# Patient Record
Sex: MALE | Race: WHITE | NOT HISPANIC OR LATINO | Employment: OTHER | URBAN - METROPOLITAN AREA
[De-identification: names, ages, dates, MRNs, and addresses within clinical notes are randomized per-mention and may not be internally consistent; named-entity substitution may affect disease eponyms.]

---

## 2017-10-11 ENCOUNTER — CLINICAL SUPPORT (OUTPATIENT)
Dept: AUDIOLOGY | Facility: CLINIC | Age: 72
End: 2017-10-11
Payer: COMMERCIAL

## 2017-10-11 ENCOUNTER — OFFICE VISIT (OUTPATIENT)
Dept: OTOLARYNGOLOGY | Facility: CLINIC | Age: 72
End: 2017-10-11
Payer: COMMERCIAL

## 2017-10-11 VITALS — HEIGHT: 68 IN | BODY MASS INDEX: 27.63 KG/M2 | WEIGHT: 182.31 LBS

## 2017-10-11 DIAGNOSIS — H90.3 SNHL (SENSORY-NEURAL HEARING LOSS), ASYMMETRICAL: ICD-10-CM

## 2017-10-11 DIAGNOSIS — H90.3 SENSORINEURAL HEARING LOSS (SNHL) OF BOTH EARS: Primary | ICD-10-CM

## 2017-10-11 DIAGNOSIS — D33.3 CPA (CEREBELLOPONTINE ANGLE) TUMOR: Primary | ICD-10-CM

## 2017-10-11 PROCEDURE — 99212 OFFICE O/P EST SF 10 MIN: CPT | Mod: PBBFAC | Performed by: OTOLARYNGOLOGY

## 2017-10-11 PROCEDURE — 99999 PR PBB SHADOW E&M-EST. PATIENT-LVL II: CPT | Mod: PBBFAC,,, | Performed by: OTOLARYNGOLOGY

## 2017-10-11 PROCEDURE — 92567 TYMPANOMETRY: CPT | Mod: PBBFAC | Performed by: AUDIOLOGIST

## 2017-10-11 PROCEDURE — 99205 OFFICE O/P NEW HI 60 MIN: CPT | Mod: S$PBB,,, | Performed by: OTOLARYNGOLOGY

## 2017-10-11 PROCEDURE — 92557 COMPREHENSIVE HEARING TEST: CPT | Mod: PBBFAC | Performed by: AUDIOLOGIST

## 2017-10-11 NOTE — PROGRESS NOTES
Subjective:       Patient ID: Dao Castro is a 72 y.o. male.    Chief Complaint: Hearing Loss    HPI   72 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  He initially presented c persistent asymmetric SNHL on left, assoc with progressive, gradual decr in hearing. 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    He currently denies any otologic stx including hearing loss, otalgia, otorrhea, tinnitus, vertigo, facial nn weakness. Denies intracranial symptoms.    He denies h/o trauma or ear surgery or ear pathology. Wears hearing aid on right.    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        Review of Systems    Review of Systems reviewed including    CONSTITUTIONAL: no fevers, chills, weight loss, night sweats  EYES: no diplopia, no blurry vision  ENT: as above   NEURO: no motor or sensory deficits  CV: no CP, no palpitations  PULM: no cough, no SOB, no wheezing   GI: no abd pain, no constipation/diarrhea  : no dysuria, no hematuria  MSK: no bone/joint pain  HEM: no bruising or bleeding   PSYCH - no depression, no anxiety    Past Medical History:   Diagnosis Date    Dizziness     Hearing loss     Hyperlipidemia          Current Outpatient Prescriptions:     ATORVASTATIN CALCIUM (ATORVASTATIN ORAL), Take by mouth., Disp: , Rfl:     Past Surgical History:   Procedure Laterality Date    APPENDECTOMY      ESOPHAGUS SURGERY      TONSILLECTOMY         Social History   Substance Use Topics    Smoking status: Former Smoker    Smokeless tobacco: Not on file    Alcohol use No       No family history on file.    Review of patient's allergies indicates:  No Known Allergies      Objective:      Physical Exam    General: NAD; Well appearing  Neuro: AAOx3. CN II-XII intact.  Cardiovascular: normal rate  Respiratory: No labored breathing, no stridor  Head/Face:  Normal inspection  Eyes: EOMI; PERRLA     Ears examined  Right Ear: hearing aid on right; Auricle WNL. EAC WNL. TM normal.      Left Ear: Auricle WNL. EAC WNL. TM normal c focal sclerosis anteriorinf quadrant.    Nose: normal ext appearance and palpation.  OC/OP: wnl  Neck/Lymphatic: No LAD.      [facility]        Assessment:       1. CPA tumor left - 1.2 cm c assoc asymetric SNHL    2. SNHL asymmetrical, AS>AD        Plan:       **[facility]**  Educated patient on acoustic neuroma, including r/b/a to treatment vs observation.  We recommend observation c f/u MRI IAC in 6 months ([facility] be done @ [facility])  If lesion grows, needs follow-up with [facility] to discuss tx via radiotherapy  If lesion remains fairly stable in size, needs f/u MRI q 12 mos  May cont f/u with [facility] prn

## 2017-10-11 NOTE — PROGRESS NOTES
Mr. Castro was seen in the clinic today for a hearing evaluation. He currently wears a right hearing aid.     Audiological testing revealed a mild to profound sensorineural hearing loss in the right ear and a severe to profound sensorineural hearing loss in the left ear. A speech reception threshold was obtained at 55 dBHL in the right ear and 80 dBHL in the left ear. Speech discrimination was 72% in the right ear and 0% in the left ear.    Tympanometry revealed Type A tympanograms, bilaterally.    Recommendations:  1. Otologic evaluation  2. Annual hearing evaluation  3. Noise protection  4. Continued use of right hearing aid

## 2017-10-11 NOTE — LETTER
October 11, 2017      Gracie Mcqueen MD  [address] [facility] [address]           [facility]  [facility] [address]  Phone: [phone]  Fax: [phone]          Patient: Dao Castro   MR Number: [id number]   YOB: 1945   Date of Visit: 10/11/2017       Dear Dr. Gracie Mcqueen:    Thank you for referring Dao Castro to me for evaluation. Attached you will find relevant portions of my assessment and plan of care.    If you have questions, please do not hesitate to call me. I look forward to following Dao Castro along with you.    Sincerely,    Gian Mcnally MD    Enclosure  CC:  No Recipients    If you would like to receive this communication electronically, please contact [email or web address] or [phone] to request more information on [employer] Link access.    For providers and/or their staff who would like to refer a patient to [facility], please contact us through our one-stop-shop provider referral line, [facility], at [phone].    If you feel you have received this communication in error or would no longer like to receive these types of communications, please e-mail [email or web address]